# Patient Record
Sex: MALE | Race: WHITE
[De-identification: names, ages, dates, MRNs, and addresses within clinical notes are randomized per-mention and may not be internally consistent; named-entity substitution may affect disease eponyms.]

---

## 2020-10-10 ENCOUNTER — HOSPITAL ENCOUNTER (INPATIENT)
Dept: HOSPITAL 54 - MED | Age: 50
LOS: 2 days | Discharge: HOME | DRG: 111 | End: 2020-10-12
Attending: NURSE PRACTITIONER | Admitting: INTERNAL MEDICINE
Payer: MEDICAID

## 2020-10-10 VITALS — SYSTOLIC BLOOD PRESSURE: 159 MMHG | DIASTOLIC BLOOD PRESSURE: 97 MMHG

## 2020-10-10 VITALS — WEIGHT: 263 LBS | BODY MASS INDEX: 37.65 KG/M2 | HEIGHT: 70 IN

## 2020-10-10 VITALS — DIASTOLIC BLOOD PRESSURE: 89 MMHG | SYSTOLIC BLOOD PRESSURE: 140 MMHG

## 2020-10-10 DIAGNOSIS — Z88.8: ICD-10-CM

## 2020-10-10 DIAGNOSIS — E66.9: ICD-10-CM

## 2020-10-10 DIAGNOSIS — I10: ICD-10-CM

## 2020-10-10 DIAGNOSIS — Z88.6: ICD-10-CM

## 2020-10-10 DIAGNOSIS — D72.829: ICD-10-CM

## 2020-10-10 DIAGNOSIS — H81.10: Primary | ICD-10-CM

## 2020-10-10 PROCEDURE — G0378 HOSPITAL OBSERVATION PER HR: HCPCS

## 2020-10-10 NOTE — NUR
MS RN ADMISSION NOTES

PATIENT ADMITTED FROM [facility] FOR INTRACTABLE VERTIGO. ARRIVED VIA ACLS PROTOCOL; IN 
STABLE CONDITION. A/OX4; ABLE TO VERBALIZE NEEDS. STABLE ON RA. DENIES SOB OR PAIN AT THIS 
TIME; BREATHING IS EVEN AND UNLABORED. PATIENT DENIES DIZZINESS AT THIS TIME; STATES VERTIGO 
EPISODES OCCUR WHEN HE WALKS. IV PRESENT ON RIGHT FA, SIZE 20, INTACT & PATENT, HEP LOCKED. 
NO WOUNDS PRESENT; SKIN DRY AND INTACT. MRSA SWAB COLLECTED. PATIENT STATES HE DOES NOT TAKE 
ANY HOME MEDICATIONS. BELONGINGS REVIEWED WITH PATIENT AND LIST PLACED IN CHART. NOTIFIED ON 
CALL CHEKO WALTERS, OF DIRECT ADMIT; AWAITING ADMISSION ORDERS. SAFETY MEASURES IN PLACE 
AND PATIENT'S NEEDS MET. BED LOCKED, ALARM ON, SIDE RAILS X2, CALL LIGHT WITHIN REACH. WILL 
CONTINUE TO MONITOR.  

-------------------------------------------------------------------------------

Addendum: 10/11/20 at 0435 by HUNTER HORTON RN

-------------------------------------------------------------------------------

ORTHOSTATIC BLOOD PRESSURE

LYING - BP: 140/89, HR: 85

SITTING - BP: 159/97, HR: 90

STANDING - BP: 169/94, HR: 97

## 2020-10-11 VITALS — SYSTOLIC BLOOD PRESSURE: 143 MMHG | DIASTOLIC BLOOD PRESSURE: 98 MMHG

## 2020-10-11 VITALS — SYSTOLIC BLOOD PRESSURE: 140 MMHG | DIASTOLIC BLOOD PRESSURE: 80 MMHG

## 2020-10-11 VITALS — SYSTOLIC BLOOD PRESSURE: 144 MMHG | DIASTOLIC BLOOD PRESSURE: 86 MMHG

## 2020-10-11 VITALS — DIASTOLIC BLOOD PRESSURE: 93 MMHG | SYSTOLIC BLOOD PRESSURE: 151 MMHG

## 2020-10-11 VITALS — SYSTOLIC BLOOD PRESSURE: 158 MMHG | DIASTOLIC BLOOD PRESSURE: 91 MMHG

## 2020-10-11 VITALS — SYSTOLIC BLOOD PRESSURE: 164 MMHG | DIASTOLIC BLOOD PRESSURE: 94 MMHG

## 2020-10-11 LAB
BASOPHILS # BLD AUTO: 0.1 /CMM (ref 0–0.2)
BASOPHILS NFR BLD AUTO: 0.7 % (ref 0–2)
BUN SERPL-MCNC: 8 MG/DL (ref 7–18)
CALCIUM SERPL-MCNC: 8.7 MG/DL (ref 8.5–10.1)
CHLORIDE SERPL-SCNC: 102 MMOL/L (ref 98–107)
CHOLEST SERPL-MCNC: 208 MG/DL (ref ?–200)
CO2 SERPL-SCNC: 26 MMOL/L (ref 21–32)
CREAT SERPL-MCNC: 0.7 MG/DL (ref 0.6–1.3)
EOSINOPHIL NFR BLD AUTO: 1.5 % (ref 0–6)
GLUCOSE SERPL-MCNC: 115 MG/DL (ref 74–106)
HCT VFR BLD AUTO: 41 % (ref 39–51)
HDLC SERPL-MCNC: 43 MG/DL (ref 40–60)
HGB BLD-MCNC: 14 G/DL (ref 13.5–17.5)
LDLC SERPL DIRECT ASSAY-MCNC: 134 MG/DL (ref 0–99)
LYMPHOCYTES NFR BLD AUTO: 26.4 % (ref 20–44)
LYMPHOCYTES NFR BLD AUTO: 3 /CMM (ref 0.8–4.8)
MAGNESIUM SERPL-MCNC: 2 MG/DL (ref 1.8–2.4)
MCHC RBC AUTO-ENTMCNC: 34 G/DL (ref 31–36)
MCV RBC AUTO: 101 FL (ref 80–96)
MONOCYTES NFR BLD AUTO: 0.7 /CMM (ref 0.1–1.3)
MONOCYTES NFR BLD AUTO: 6.3 % (ref 2–12)
NEUTROPHILS # BLD AUTO: 7.3 /CMM (ref 1.8–8.9)
NEUTROPHILS NFR BLD AUTO: 65.1 % (ref 43–81)
PHOSPHATE SERPL-MCNC: 3.5 MG/DL (ref 2.5–4.9)
PLATELET # BLD AUTO: 197 /CMM (ref 150–450)
POTASSIUM SERPL-SCNC: 3.9 MMOL/L (ref 3.5–5.1)
RBC # BLD AUTO: 4.08 MIL/UL (ref 4.5–6)
SODIUM SERPL-SCNC: 140 MMOL/L (ref 136–145)
TRIGL SERPL-MCNC: 147 MG/DL (ref 30–150)
WBC NRBC COR # BLD AUTO: 11.2 K/UL (ref 4.3–11)

## 2020-10-11 RX ADMIN — SODIUM CHLORIDE PRN MLS/HR: 9 INJECTION, SOLUTION INTRAVENOUS at 01:02

## 2020-10-11 RX ADMIN — AMLODIPINE BESYLATE SCH MG: 5 TABLET ORAL at 10:05

## 2020-10-11 RX ADMIN — SODIUM CHLORIDE PRN MLS/HR: 9 INJECTION, SOLUTION INTRAVENOUS at 15:55

## 2020-10-11 NOTE — NUR
MS RN OPENING NOTES

PATIENT AWAKE IN BED. A/OX4. ON RA; NO S/S OF ACUTE RESPIRATORY DISTRESS; BREATHING IS EVEN 
AND UNLABORED. NO C/O PAIN OR DIZZINESS. IV PRESENT ON RIGHT FA, SIZE 20, INTACT & PATENT 
WITH NS RUNNING AT 75 ML/HR. SAFETY MEASURES IN PLACE AND PATIENT'S NEEDS MET. BED LOCKED, 
HOB ELEVATED, SIDE RAILS X2, CALL LIGHT WITHIN REACH. WILL CONTINUE TO MONITOR.

## 2020-10-11 NOTE — NUR
RN NOTES



PATIENT IN BED RESTING COMFORTABLY IN MODERATE HIGH BACK REST. A/OX4. STABLE ON RA; NO SIGNS 
OF DISTRESS NOTED THROUGHOUT THE SHIFT. IV FLUIDS ON RIGHT FA, #20, WITH NS RUNNING 
@75ML/HR. SAFETY MEASURES IN PLACE. BED LOCKED, SIDE RAILS X2, CALL LIGHT WITHIN REACH. WILL 
ENDORSE TO NIGHT SHIFT NURSE FOR CARLOS.

## 2020-10-11 NOTE — NUR
RN NOTES



RECEIVED PATIENT IN BED RESTING COMFORTABLY IN MODERATE HIGH BACK REST. A/OX4. STABLE ON RA; 
NO SIGNS OF DISTRESS NOTED AT THIS TIME. TELE MONITOR READING SR. IV ACCESS ON RIGHT FA, 
#20, INTACT & PATENT WITH NS RUNNING AT 75 ML/HR. SAFETY MEASURES IN PLACE. BED LOCKED, SIDE 
RAILS X2, CALL LIGHT WITHIN REACH. WILL CONTINUE TO MONITOR.

## 2020-10-11 NOTE — NUR
TELE RN CLOSING NOTES

PATIENT AWAKE IN BED. A/OX4. STABLE ON RA; NO C/O SOB, PAIN, OR DIZZINESS; BREATHING IS EVEN 
AND UNLABORED. TELE MONITOR READING NSR. IV PRESENT ON RIGHT FA, SIZE 20, INTACT & PATENT 
WITH NS RUNNING AT 75 ML/HR. SAFETY MEASURES IN PLACE AND PATIENT'S NEEDS MET. BED LOCKED, 
HOB ELEVATED, SIDE RAILS X2, CALL LIGHT WITHIN REACH. ENDORSED TO DAY SHIFT RN PLAN OF CARE.

## 2020-10-12 VITALS — SYSTOLIC BLOOD PRESSURE: 149 MMHG | DIASTOLIC BLOOD PRESSURE: 92 MMHG

## 2020-10-12 VITALS — DIASTOLIC BLOOD PRESSURE: 92 MMHG | SYSTOLIC BLOOD PRESSURE: 149 MMHG

## 2020-10-12 RX ADMIN — AMLODIPINE BESYLATE SCH MG: 5 TABLET ORAL at 08:35

## 2020-10-12 NOTE — NUR
RN NOTES



CALLED [facility] TO CONFIRM IF THE MEDICATION IS READY, PER PHARMACIST MEDICATION IS 
READY AND PATIENT JUST NEEDS TO BRING THE INSURANCE CARD TO PICKED IT UP.

## 2020-10-12 NOTE — NUR
RN NOTES



RECEIVED PATIENT IN BED RESTING COMFORTABLY IN MODERATE HIGH BACK REST. A/OX4. STABLE ON RA; 
NO SIGNS OF DISTRESS NOTED AT THIS TIME. IV ACCESS ON RIGHT WRIST, #22, INTACT & PATENT WITH 
NS RUNNING AT 75 ML/HR. SAFETY MEASURES IN PLACE. BED LOCKED, SIDE RAILS X2, CALL LIGHT 
WITHIN REACH. WILL CONTINUE TO MONITOR.

## 2020-10-12 NOTE — NUR
RN DISCHARGED NOTES



PATIENT DISCHARGED IN STABLE CONDITION. A/O X4. ABLE TO MAKE NEEDS KNOWN. V/S TAKEN, STABLE 
AND RECORDED. IV ACCESS REMOVED AND APPLIED PRESSURE DRESSINGS. SKIN IS INTACT. NAME ARM 
BAND REMOVED. ALL BELONGINGS CHECKED AND SIGNED. HEALTH TEACHINGS/DISCHARGED INSTRUCTION 
GIVEN AND VERBALIZED UNDERSTANDING. PATIENT LEFT UNIT VIA WHEELCHAIR AND ACCOMPANIED TO THE 
LOBBY WITH NO SIGNS OF DISTRESS NOTED. PICKED UP BY BROTHER AND SISTER. CHARGE NURSE AWARE 
OF DISCHARGED.

## 2020-10-12 NOTE — NUR
MS RN CLOSING NOTES

PATIENT SLEEPING, EASY TO AWAKEN. A/OX4. ON RA; NO S/S OF ACUTE RESPIRATORY DISTRESS; 
BREATHING IS EVEN AND UNLABORED. NO C/O PAIN. IV PRESENT ON RIGHT WRIST, SIZE 22, INTACT & 
PATENT WITH NS RUNNING AT 75 ML/HR. SAFETY MEASURES IN PLACE AND PATIENT'S NEEDS MET. BED 
LOCKED, HOB ELEVATED, SIDE RAILS X2, CALL LIGHT WITHIN REACH. WILL ENDORSE TO DAY SHIFT RN 
PLAN OF CARE.